# Patient Record
Sex: MALE | Race: WHITE | NOT HISPANIC OR LATINO | URBAN - METROPOLITAN AREA
[De-identification: names, ages, dates, MRNs, and addresses within clinical notes are randomized per-mention and may not be internally consistent; named-entity substitution may affect disease eponyms.]

---

## 2017-05-19 ENCOUNTER — OFFICE VISIT (OUTPATIENT)
Dept: MEDICAL GROUP | Facility: MEDICAL CENTER | Age: 62
End: 2017-05-19
Payer: COMMERCIAL

## 2017-05-19 VITALS
HEART RATE: 76 BPM | TEMPERATURE: 98.4 F | WEIGHT: 213 LBS | DIASTOLIC BLOOD PRESSURE: 84 MMHG | HEIGHT: 74 IN | RESPIRATION RATE: 16 BRPM | OXYGEN SATURATION: 95 % | SYSTOLIC BLOOD PRESSURE: 142 MMHG | BODY MASS INDEX: 27.34 KG/M2

## 2017-05-19 DIAGNOSIS — N50.89 SCROTAL MASS: ICD-10-CM

## 2017-05-19 DIAGNOSIS — Z94.0 H/O KIDNEY TRANSPLANT: ICD-10-CM

## 2017-05-19 DIAGNOSIS — E78.5 DYSLIPIDEMIA: ICD-10-CM

## 2017-05-19 DIAGNOSIS — I10 ESSENTIAL HYPERTENSION: ICD-10-CM

## 2017-05-19 PROCEDURE — 99396 PREV VISIT EST AGE 40-64: CPT | Performed by: INTERNAL MEDICINE

## 2017-05-19 RX ORDER — SODIUM BICARBONATE 650 MG/1
650 TABLET ORAL DAILY
COMMUNITY

## 2017-05-19 NOTE — MR AVS SNAPSHOT
"        Shai Gonzalezs   2017 11:00 AM   Office Visit   MRN: [id number]    Department:  [address] [place] [address]   Dept Phone:  [phone]    Description:  Male : 1955   Provider:  Camden Lechuga M.D.           Reason for Visit     Annual Exam           Allergies as of 2017     No Known Allergies      You were diagnosed with     Dyslipidemia   [570123]         Vital Signs     Blood Pressure Pulse Temperature Respirations Height Weight    142/84 mmHg 76 36.9 °C (98.4 °F) 16 1.88 m (6' 2\") 96.616 kg (213 lb)    Body Mass Index Oxygen Saturation Smoking Status             27.34 kg/m2 95% Never Smoker          Basic Information     Date Of Birth Sex Race Ethnicity Preferred Language    1955 Male White Non- English      Your appointments     2017 10:00 AM   Established Patient with Camden Lechuga M.D.   [facility] [address]    [address] [facility] [address]  [facility] [id number] [phone]           You will be receiving a confirmation call a few days before your appointment from our automated call confirmation system.              Problem List              ICD-10-CM Priority Class Noted - Resolved    H/O kidney transplant Z94.0   2016 - Present    Essential hypertension I10   2016 - Present    Dyslipidemia E78.5   2016 - Present      Health Maintenance        Date Due Completion Dates    IMM DTaP/Tdap/Td Vaccine (1 - Tdap) 1973 ---    IMM PNEUMOCOCCAL 19-64 (ADULT) HIGHEST RISK SERIES (1 of 3 - PCV13) 1973 ---    COLONOSCOPY 2004 ---    IMM ZOSTER VACCINE 2014 ---            Current Immunizations     No immunizations on file.      Below and/or attached are the medications your provider expects you to take. Review all of your home medications and newly ordered medications with your provider and/or pharmacist. Follow medication instructions as directed by your provider and/or pharmacist. Please keep your medication list with you " and share with your provider. Update the information when medications are discontinued, doses are changed, or new medications (including over-the-counter products) are added; and carry medication information at all times in the event of emergency situations     Allergies:  No Known Allergies          Medications  Valid as of: May 19, 2017 - 11:29 AM    Generic Name Brand Name Tablet Size Instructions for use    Allopurinol (Tab) ZYLOPRIM 100 MG Take 100 mg by mouth every day.        AmLODIPine Besylate (Tab) NORVASC 10 MG Take 1 Tab by mouth every day.        Amoxicillin-Pot Clavulanate (Tab) AUGMENTIN 875-125 MG Take 1 Tab by mouth 2 times a day.        Aspirin (Chew Tab) ASA 81 MG Take 81 mg by mouth every day.        Cholecalciferol (Tab) cholecalciferol 1000 UNIT Take 1,000 Units by mouth every day.        Lisinopril (Tab) PRINIVIL 10 MG Take 10 mg by mouth every day.        Metoprolol Tartrate (Tab) LOPRESSOR 25 MG Take 25 mg by mouth 2 times a day.        Mycophenolate Mofetil (Cap) CELLCEPT 250 MG Take 250 mg by mouth 2 times a day.        Pravastatin Sodium (Tab) PRAVACHOL 20 MG Take 1 Tab by mouth every day.        Sodium Bicarbonate (Solution) sodium bicarbonate 8.4 % 50 mEq by Intravenous route Once.        Sodium Bicarbonate (Tab) SODIUM BICARBONATE 650 MG Take 650 mg by mouth every day.        Sulfamethoxazole-Trimethoprim (Tab) BACTRIM -160 MG Take 1 Tab by mouth 2 times a day.        Tacrolimus (Cap) PROGRAF 1 MG Take 1 mg by mouth 2 times a day.        .                 Medicines prescribed today were sent to:     [facility] [address] [facility] [place] [facility] [address] [facility] [address] [facility] [address]    [address] [address]    Phone: [phone] Fax: [phone]    Open 24 Hours?: No      Medication refill instructions:       If your prescription bottle indicates you have medication refills left, it is not necessary to call your provider’s office. Please contact your pharmacy and  they will refill your medication.    If your prescription bottle indicates you do not have any refills left, you may request refills at any time through one of the following ways: The online Key[employer] system (except Urgent Care), by calling your provider’s office, or by asking your pharmacy to contact your provider’s office with a refill request. Medication refills are processed only during regular business hours and may not be available until the next business day. Your provider may request additional information or to have a follow-up visit with you prior to refilling your medication.   *Please Note: Medication refills are assigned a new Rx number when refilled electronically. Your pharmacy may indicate that no refills were authorized even though a new prescription for the same medication is available at the pharmacy. Please request the medicine by name with the pharmacy before contacting your provider for a refill.        Your To Do List     Future Labs/Procedures Complete By Expires    LIPID PROFILE  As directed 5/20/2018         Keypr Status: Patient Declined

## 2017-05-19 NOTE — PROGRESS NOTES
Chief Complaint:     Shai Elizabeth is a 62 y.o. male who presents for a general exam and wellness check. We have not seen him in several years.    Dyslipidemia  He has a history of hyperlipidemia for which she takes pravastatin. He has not had a recent lipid panel. He tries to follow appropriate diet with some success.    Essential hypertension  He has a history of high blood pressure for which he takes metoprolol as well as lisinopril and amlodipine. He denies lightheadedness or headaches.    H/O kidney transplant  He had a prior kidney transplant for which she is followed Dr. Souza. Kidney function he reports is doing quite well. He notes that his ankles tend to swell.    Scrotal mass  For about the last 6 months he has noticed a lump on his left testicle that does not seem to be changing in size. He would like to have that checked. On examination it feels most compatible with the head of the epididymis. We will check an ultrasound.      Last colonoscopy: Uncertain.  Last Td:  Uncertain. He thinks it might have been in the last 6 months but is not sure.  Hx STDs: no  Regular exercise: yes     He  has no past medical history on file.  He  has past surgical history that includes av fistula creation; kidney harvest; and cyst excision.  Family History   Problem Relation Age of Onset   • Cancer Mother      bladder   • Heart Disease Father      Social History   Substance Use Topics   • Smoking status: Never Smoker    • Smokeless tobacco: Never Used   • Alcohol Use: Yes      Comment: rare       Current Outpatient Prescriptions   Medication Sig Dispense Refill   • sodium bicarbonate (SODIUM BICARBONATE) 650 MG Tab Take 650 mg by mouth every day.     • mycophenolate (CELLCEPT) 250 MG Cap Take 250 mg by mouth 2 times a day.     • tacrolimus (PROGRAF) 1 MG Cap Take 1 mg by mouth 2 times a day.     • metoprolol (LOPRESSOR) 25 MG Tab Take 25 mg by mouth 2 times a day.     • allopurinol (ZYLOPRIM) 100 MG Tab Take 100 mg by  mouth every day.     • aspirin (ASA) 81 MG Chew Tab chewable tablet Take 81 mg by mouth every day.     • vitamin D (CHOLECALCIFEROL) 1000 UNIT Tab Take 1,000 Units by mouth every day.     • lisinopril (PRINIVIL) 10 MG Tab Take 10 mg by mouth every day.     • amlodipine (NORVASC) 10 MG Tab Take 1 Tab by mouth every day. 30 Tab 11   • pravastatin (PRAVACHOL) 20 MG Tab Take 1 Tab by mouth every day. 30 Tab 11   • amoxicillin-clavulanate (AUGMENTIN) 875-125 MG Tab Take 1 Tab by mouth 2 times a day.     • sulfamethoxazole-trimethoprim (BACTRIM DS) 800-160 MG tablet Take 1 Tab by mouth 2 times a day.     • sodium bicarbonate 8.4 % Solution 50 mEq by Intravenous route Once.       No current facility-administered medications for this visit.    (including changes today)  Allergies: Review of patient's allergies indicates no known allergies.    ROS:   General:  no recent change in strength, weight, appetite, or exercise tolerance.  CNS: no significant lightheadedness, headaches, fainting spells, seizures, or change in mentation.  EENT: no significant change in vision, hearing, sense of smell, swallowing, or voice.  RESP: no significant wheezing, coughing, or dyspnea.  CV: no significant palpitations or chest pain.  G.I.: no significant indigestion, abdominal pain, or change in bowel habits.  : no significant change in urinating, no dysuria or hematuria, or change in sexual function, or change in testes. Left scrotal mass as noted that he has noticed for the last 6 months. He has had a renal transplant as noted. This appears to be functioning appropriately.  EXTREM:  no significant edema, swelling, pain, or limitations of motion.  INTEG: no significant change in skin, hair or fingernails. He did have what he thinks is a cyst develop on his left upper anterior chest. This was drained surgically and treated with antibiotics. It has not recurred.  LYMPH: no significant adenopathy or other masses.  PSYCH: no significant anxiety  "or depression.        PHYSICAL EXAMINATION:  Body mass index is 27.34 kg/(m^2).  Wt Readings from Last 4 Encounters:   05/19/17 96.616 kg (213 lb)   12/02/16 94.3 kg (207 lb 14.3 oz)     PE:  /84 mmHg  Pulse 76  Temp(Src) 36.9 °C (98.4 °F)  Resp 16  Ht 1.88 m (6' 2\")  Wt 96.616 kg (213 lb)  BMI 27.34 kg/m2  SpO2 95%    GEN: clean, well groomed, in no acute distress, alert.  EENT: PERRL, normal EOMs, fundi unremarkable, normal external auditory canals and tympanic membranes, pharynx unremarkable, dentition satisfactory, nose unremarkable, neck supple and without significant lymphadenopathy or masses, trachea midline, thyroid unremarkable.  LUNGS: bilateral breath sounds, without significant wheezes or rales or abnormalities in percussion.  CV:  peripheral circulation is satisfactory, pedal pulses are satisfactory, heart sounds are unremarkable.  ABD: soft, without significant masses, no hepatosplenomegaly, no tenderness, bowel sounds are normal, no significant inguinal adenopathy. Transplanted kidney is palpated in the right lower quadrant.  : normal external genitalia, without urethral discharge, testes without significant masses, rectal exam unremarkable, prostate of normal contour and consistency, mass in the left hemiscrotum feels most compatible with the head of the left epididymis  EXTREM:  without significant edema, cyanosis or deformity.  LYMPH:  no significant lymphadenopathy or lymphedema.  INTEG:  skin, hair, fingernails are unremarkable without significant rashes or lesions. The area of the prior cyst is unremarkable. There is no fluctuance.  NEURO:  essentially normal sensation, strength, gate, and cerebellar function, normal DTRs, affect is normal, oriented times three.  PSYCH: appropriate affect and mood.        ASSESSMENT/PLAN:  1. Dyslipidemia  LIPID PROFILE    We again reviewed appropriate diet. We will check a lipid panel. He will continue pravastatin.   2. H/O kidney transplant      " Continue regular follow-up with Dr. Souza.   3. Essential hypertension      We reviewed low-salt diet. No medication change.   4. Scrotal mass      We will arrange for a scrotal ultrasound by think this is most compatible with the head of the epididymis.     Labs per orders  Counseling about diet, exercise, skin care  Vaccinations per orders  HM: He will check on records at home regarding prior vaccinations. He will discuss Zostavax with Dr. Souza.  Next office visit for recheck of chronic medical conditions is due in 6 months

## 2017-05-19 NOTE — ASSESSMENT & PLAN NOTE
For about the last 6 months he has noticed a lump on his left testicle that does not seem to be changing in size. He would like to have that checked. On examination it feels most compatible with the head of the epididymis. We will check an ultrasound.

## 2017-05-19 NOTE — ASSESSMENT & PLAN NOTE
He had a prior kidney transplant for which she is followed Dr. Souza. Kidney function he reports is doing quite well. He notes that his ankles tend to swell.

## 2017-05-19 NOTE — ASSESSMENT & PLAN NOTE
He has a history of high blood pressure for which he takes metoprolol as well as lisinopril and amlodipine. He denies lightheadedness or headaches.

## 2017-05-19 NOTE — ASSESSMENT & PLAN NOTE
He has a history of hyperlipidemia for which she takes pravastatin. He has not had a recent lipid panel. He tries to follow appropriate diet with some success.

## 2017-06-02 DIAGNOSIS — E78.5 DYSLIPIDEMIA: ICD-10-CM

## 2017-06-02 LAB
CHOLEST SERPL-MCNC: 233 MG/DL (ref 100–199)
COMMENT 011824: ABNORMAL
HDLC SERPL-MCNC: 61 MG/DL
LDLC SERPL CALC-MCNC: 140 MG/DL (ref 0–99)
TRIGL SERPL-MCNC: 160 MG/DL (ref 0–149)
VLDLC SERPL CALC-MCNC: 32 MG/DL (ref 5–40)

## 2017-06-02 RX ORDER — PRAVASTATIN SODIUM 40 MG
40 TABLET ORAL EVERY EVENING
Qty: 90 TAB | Refills: 3 | Status: SHIPPED | OUTPATIENT
Start: 2017-06-02 | End: 2018-04-04 | Stop reason: SDUPTHER

## 2017-07-07 ENCOUNTER — HOSPITAL ENCOUNTER (OUTPATIENT)
Dept: HOSPITAL 8 - CFH | Age: 62
Discharge: HOME | End: 2017-07-07
Attending: INTERNAL MEDICINE
Payer: COMMERCIAL

## 2017-07-07 DIAGNOSIS — M10.9: ICD-10-CM

## 2017-07-07 DIAGNOSIS — D75.1: ICD-10-CM

## 2017-07-07 DIAGNOSIS — N18.3: ICD-10-CM

## 2017-07-07 DIAGNOSIS — E78.5: ICD-10-CM

## 2017-07-07 DIAGNOSIS — N50.3: Primary | ICD-10-CM

## 2017-07-07 DIAGNOSIS — D89.9: ICD-10-CM

## 2017-07-07 DIAGNOSIS — I12.9: ICD-10-CM

## 2017-07-07 PROCEDURE — 76870 US EXAM SCROTUM: CPT

## 2017-11-20 ENCOUNTER — OFFICE VISIT (OUTPATIENT)
Dept: MEDICAL GROUP | Facility: MEDICAL CENTER | Age: 62
End: 2017-11-20
Payer: COMMERCIAL

## 2017-11-20 VITALS
HEART RATE: 73 BPM | WEIGHT: 208 LBS | TEMPERATURE: 97.9 F | RESPIRATION RATE: 16 BRPM | DIASTOLIC BLOOD PRESSURE: 84 MMHG | HEIGHT: 74 IN | SYSTOLIC BLOOD PRESSURE: 132 MMHG | OXYGEN SATURATION: 97 % | BODY MASS INDEX: 26.69 KG/M2

## 2017-11-20 DIAGNOSIS — I10 ESSENTIAL HYPERTENSION: ICD-10-CM

## 2017-11-20 DIAGNOSIS — E78.5 DYSLIPIDEMIA: ICD-10-CM

## 2017-11-20 DIAGNOSIS — Z23 NEED FOR STREPTOCOCCUS PNEUMONIAE VACCINATION: ICD-10-CM

## 2017-11-20 DIAGNOSIS — N50.89 SCROTAL MASS: ICD-10-CM

## 2017-11-20 DIAGNOSIS — Z94.0 H/O KIDNEY TRANSPLANT: ICD-10-CM

## 2017-11-20 PROCEDURE — 90670 PCV13 VACCINE IM: CPT | Performed by: INTERNAL MEDICINE

## 2017-11-20 PROCEDURE — 99214 OFFICE O/P EST MOD 30 MIN: CPT | Mod: 25 | Performed by: INTERNAL MEDICINE

## 2017-11-20 PROCEDURE — 90471 IMMUNIZATION ADMIN: CPT | Performed by: INTERNAL MEDICINE

## 2017-11-20 ASSESSMENT — PATIENT HEALTH QUESTIONNAIRE - PHQ9: CLINICAL INTERPRETATION OF PHQ2 SCORE: 0

## 2017-11-20 NOTE — PROGRESS NOTES
Subjective:     Chief Complaint   Patient presents with   • Follow-Up     routine f/u     Shia Elizabeth is a 62 y.o. male here today for Follow-up of his various health problems.    Scrotal mass  He reports that the scrotal mass is unchanged recently. He did have an ultrasound which revealed benign appearance.    Need for Streptococcus pneumoniae vaccination  He has not had Prevnar and we'll get that done today.    H/O kidney transplant  Prior kidney transplant. Renal function is doing well. He is followed for this by Dr. Souza.    Essential hypertension  Blood pressure when he first got here was a little high but repeat check was in better range. He tries to follow appropriate diet. He denies lightheadedness or headaches.    Dyslipidemia  He continues pravastatin. He tries to follow appropriate diet. He is a little overweight. Lipid panel in June showed cholesterol 233, triglycerides 160, HDL 61, .       Diagnoses of Essential hypertension, Dyslipidemia, Need for Streptococcus pneumoniae vaccination, Scrotal mass, and H/O kidney transplant were pertinent to this visit.    Allergies: Patient has no known allergies.  Current medicines (including changes today)  Current Outpatient Prescriptions   Medication Sig Dispense Refill   • pravastatin (PRAVACHOL) 40 MG tablet Take 1 Tab by mouth every evening. 90 Tab 3   • sodium bicarbonate (SODIUM BICARBONATE) 650 MG Tab Take 650 mg by mouth every day.     • sulfamethoxazole-trimethoprim (BACTRIM DS) 800-160 MG tablet Take 1 Tab by mouth 2 times a day.     • mycophenolate (CELLCEPT) 250 MG Cap Take 250 mg by mouth 2 times a day.     • tacrolimus (PROGRAF) 1 MG Cap Take 1 mg by mouth 2 times a day.     • metoprolol (LOPRESSOR) 25 MG Tab Take 25 mg by mouth 2 times a day.     • allopurinol (ZYLOPRIM) 100 MG Tab Take 100 mg by mouth every day.     • aspirin (ASA) 81 MG Chew Tab chewable tablet Take 81 mg by mouth every day.     • vitamin D (CHOLECALCIFEROL) 1000 UNIT  "Tab Take 1,000 Units by mouth every day.     • lisinopril (PRINIVIL) 10 MG Tab Take 10 mg by mouth every day.     • sodium bicarbonate 8.4 % Solution 50 mEq by Intravenous route Once.     • amlodipine (NORVASC) 10 MG Tab Take 1 Tab by mouth every day. 30 Tab 11   • amoxicillin-clavulanate (AUGMENTIN) 875-125 MG Tab Take 1 Tab by mouth 2 times a day.       No current facility-administered medications for this visit.        He  has no past medical history on file.  Health Maintenance:He will get Prevnar today. We reviewed other vaccinations. He will hold off getting shingles vaccine until the new vaccine is available which is not a live attenuated virus.  ROS    Patient denies significant change in strength, weight or appetite.  No significant lightheadedness or headaches.  No change in vision, hearing, or swallowing.  No new dyspnea, coughing, chest pain, or palpitations.  No indigestion, abdominal pain, or change in bowel habits.  No change in urinating.  No new ankle swelling.       Objective:     PE:  /84   Pulse 73   Temp 36.6 °C (97.9 °F)   Resp 16   Ht 1.88 m (6' 2\")   Wt 94.3 kg (208 lb)   SpO2 97%   BMI 26.71 kg/m²   Neck is supple without significant lymphadenopathy or masses.  Lungs are clear with normal breath sounds without wheezes or rales .  Cardiovascular: peripheral circulation is satisfactory, heart sounds are unchanged and unremarkable.  Abdomen is soft, without masses or tenderness, with normal bowel sounds.  Extremities are without significant edema, cyanosis or deformity.AV fistula and right forearm.      Assessment and Plan:   The following treatment plan was discussed  1. Essential hypertension  BASIC METABOLIC PANEL    Continue same medications. We again reviewed low-salt diet. He will check his pressures at home and report them to us.   2. Dyslipidemia  LIPID PROFILE    Long discussion regarding appropriate diet. Continue pravastatin. Increase soluble fiber.   3. Need for " Streptococcus pneumoniae vaccination  PNEUMOCOCCAL CONJUGATE VACCINE 13-VALENT    He will receive Prevnar today.   4. Scrotal mass      If he notes any changes, he will contact us.   5. H/O kidney transplant      Follow up regularly with Dr. Souza.       Followup: Return in about 5 months (around 4/9/2018), or wellness, for Long, Short.

## 2017-11-20 NOTE — ASSESSMENT & PLAN NOTE
He continues pravastatin. He tries to follow appropriate diet. He is a little overweight. Lipid panel in June showed cholesterol 233, triglycerides 160, HDL 61, .

## 2017-11-20 NOTE — ASSESSMENT & PLAN NOTE
Blood pressure when he first got here was a little high but repeat check was in better range. He tries to follow appropriate diet. He denies lightheadedness or headaches.

## 2017-11-20 NOTE — ASSESSMENT & PLAN NOTE
He reports that the scrotal mass is unchanged recently. He did have an ultrasound which revealed benign appearance.

## 2018-04-04 DIAGNOSIS — E78.5 DYSLIPIDEMIA: ICD-10-CM

## 2018-04-05 RX ORDER — PRAVASTATIN SODIUM 40 MG
40 TABLET ORAL EVERY EVENING
Qty: 90 TAB | Refills: 3 | Status: SHIPPED | OUTPATIENT
Start: 2018-04-05

## 2019-08-06 ENCOUNTER — HOSPITAL ENCOUNTER (EMERGENCY)
Dept: HOSPITAL 8 - ED | Age: 64
Discharge: HOME | End: 2019-08-06
Payer: COMMERCIAL

## 2019-08-06 VITALS — SYSTOLIC BLOOD PRESSURE: 119 MMHG | DIASTOLIC BLOOD PRESSURE: 77 MMHG

## 2019-08-06 VITALS — BODY MASS INDEX: 24.39 KG/M2 | WEIGHT: 190.04 LBS | HEIGHT: 74 IN

## 2019-08-06 DIAGNOSIS — Z87.891: ICD-10-CM

## 2019-08-06 DIAGNOSIS — R10.13: Primary | ICD-10-CM

## 2019-08-06 DIAGNOSIS — E78.5: ICD-10-CM

## 2019-08-06 DIAGNOSIS — R11.2: ICD-10-CM

## 2019-08-06 DIAGNOSIS — I10: ICD-10-CM

## 2019-08-06 LAB
ALBUMIN SERPL-MCNC: 3.9 G/DL (ref 3.4–5)
ALP SERPL-CCNC: 70 U/L (ref 45–117)
ALT SERPL-CCNC: 19 U/L (ref 12–78)
ANION GAP SERPL CALC-SCNC: 10 MMOL/L (ref 5–15)
BASOPHILS # BLD AUTO: 0.04 X10^3/UL (ref 0–0.1)
BASOPHILS NFR BLD AUTO: 1 % (ref 0–1)
BILIRUB SERPL-MCNC: 0.8 MG/DL (ref 0.2–1)
CALCIUM SERPL-MCNC: 9.6 MG/DL (ref 8.5–10.1)
CHLORIDE SERPL-SCNC: 110 MMOL/L (ref 98–107)
CREAT SERPL-MCNC: 1.18 MG/DL (ref 0.7–1.3)
CULTURE INDICATED?: NO
EOSINOPHIL # BLD AUTO: 0.11 X10^3/UL (ref 0–0.4)
EOSINOPHIL NFR BLD AUTO: 2 % (ref 1–7)
ERYTHROCYTE [DISTWIDTH] IN BLOOD BY AUTOMATED COUNT: 13.9 % (ref 9.4–14.8)
LYMPHOCYTES # BLD AUTO: 1.25 X10^3/UL (ref 1–3.4)
LYMPHOCYTES NFR BLD AUTO: 20 % (ref 22–44)
MCH RBC QN AUTO: 32.1 PG (ref 27.5–34.5)
MCHC RBC AUTO-ENTMCNC: 33.9 G/DL (ref 33.2–36.2)
MCV RBC AUTO: 94.9 FL (ref 81–97)
MD: NO
MICROSCOPIC: (no result)
MONOCYTES # BLD AUTO: 0.51 X10^3/UL (ref 0.2–0.8)
MONOCYTES NFR BLD AUTO: 8 % (ref 2–9)
NEUTROPHILS # BLD AUTO: 4.29 X10^3/UL (ref 1.8–6.8)
NEUTROPHILS NFR BLD AUTO: 69 % (ref 42–75)
PLATELET # BLD AUTO: 230 X10^3/UL (ref 130–400)
PMV BLD AUTO: 8.4 FL (ref 7.4–10.4)
PROT SERPL-MCNC: 7.5 G/DL (ref 6.4–8.2)
RBC # BLD AUTO: 5.32 X10^6/UL (ref 4.38–5.82)
TROPONIN I SERPL-MCNC: < 0.015 NG/ML (ref 0–0.04)

## 2019-08-06 PROCEDURE — 71045 X-RAY EXAM CHEST 1 VIEW: CPT

## 2019-08-06 PROCEDURE — 85025 COMPLETE CBC W/AUTO DIFF WBC: CPT

## 2019-08-06 PROCEDURE — 93005 ELECTROCARDIOGRAM TRACING: CPT

## 2019-08-06 PROCEDURE — 99284 EMERGENCY DEPT VISIT MOD MDM: CPT

## 2019-08-06 PROCEDURE — 80053 COMPREHEN METABOLIC PANEL: CPT

## 2019-08-06 PROCEDURE — 76700 US EXAM ABDOM COMPLETE: CPT

## 2019-08-06 PROCEDURE — 36415 COLL VENOUS BLD VENIPUNCTURE: CPT

## 2019-08-06 PROCEDURE — 96361 HYDRATE IV INFUSION ADD-ON: CPT

## 2019-08-06 PROCEDURE — 81001 URINALYSIS AUTO W/SCOPE: CPT

## 2019-08-06 PROCEDURE — 84484 ASSAY OF TROPONIN QUANT: CPT

## 2019-08-06 PROCEDURE — 83690 ASSAY OF LIPASE: CPT

## 2019-08-06 PROCEDURE — 96375 TX/PRO/DX INJ NEW DRUG ADDON: CPT

## 2019-08-06 PROCEDURE — 96374 THER/PROPH/DIAG INJ IV PUSH: CPT

## 2019-08-06 NOTE — NUR
Pt resting on chani, aox 4, wife at bedside, bedside report recieved and plan 
of care discussed. Pt reports improved comfort, debneis nausea or pain at this 
time, fluids infusing, US complete, wiaitng on results.

## 2019-08-06 NOTE — NUR
FIRST CONTACT. PT PRESENTS WITH C/O CHEST PAIN (4/10) AND SOB. PT DENIES ANY 
CARDIAC HX. PT PLACED ON CARDIAC MONITOR, SERIAL VS AND CONT. PULSE OX. VSS. 
WIFE AT BEDSIDE.

## 2019-08-06 NOTE — NUR
Vitals updated, pt restign w/ eyes closed, no apparent distress noted, wife at 
bedside. waiting for results.

## 2021-03-03 DIAGNOSIS — Z23 NEED FOR VACCINATION: ICD-10-CM
